# Patient Record
Sex: MALE | Race: WHITE | NOT HISPANIC OR LATINO
[De-identification: names, ages, dates, MRNs, and addresses within clinical notes are randomized per-mention and may not be internally consistent; named-entity substitution may affect disease eponyms.]

---

## 2020-09-01 ENCOUNTER — OFFICE VISIT (OUTPATIENT)
Age: 65
End: 2020-09-01

## 2020-10-01 ENCOUNTER — OFFICE VISIT (OUTPATIENT)
Age: 65
End: 2020-10-01

## 2020-10-19 ENCOUNTER — OFFICE VISIT (OUTPATIENT)
Dept: URBAN - METROPOLITAN AREA CLINIC 7 | Facility: CLINIC | Age: 65
End: 2020-10-19

## 2020-10-26 ENCOUNTER — TELEPHONE ENCOUNTER (OUTPATIENT)
Dept: URBAN - METROPOLITAN AREA CLINIC 9 | Facility: CLINIC | Age: 65
End: 2020-10-26

## 2020-10-27 ENCOUNTER — TELEPHONE ENCOUNTER (OUTPATIENT)
Dept: URBAN - METROPOLITAN AREA CLINIC 9 | Facility: CLINIC | Age: 65
End: 2020-10-27

## 2020-10-28 ENCOUNTER — OFFICE VISIT (OUTPATIENT)
Dept: URBAN - METROPOLITAN AREA SURGERY CENTER 5 | Facility: SURGERY CENTER | Age: 65
End: 2020-10-28

## 2020-11-10 ENCOUNTER — OFFICE VISIT (OUTPATIENT)
Dept: URBAN - METROPOLITAN AREA CLINIC 7 | Facility: CLINIC | Age: 65
End: 2020-11-10

## 2020-12-14 ENCOUNTER — TELEPHONE ENCOUNTER (OUTPATIENT)
Dept: URBAN - METROPOLITAN AREA CLINIC 9 | Facility: CLINIC | Age: 65
End: 2020-12-14

## 2020-12-16 ENCOUNTER — TELEPHONE ENCOUNTER (OUTPATIENT)
Dept: URBAN - METROPOLITAN AREA CLINIC 9 | Facility: CLINIC | Age: 65
End: 2020-12-16

## 2020-12-17 ENCOUNTER — OFFICE VISIT (OUTPATIENT)
Dept: URBAN - METROPOLITAN AREA SURGERY CENTER 5 | Facility: SURGERY CENTER | Age: 65
End: 2020-12-17

## 2021-01-04 ENCOUNTER — TELEPHONE ENCOUNTER (OUTPATIENT)
Dept: URBAN - METROPOLITAN AREA CLINIC 9 | Facility: CLINIC | Age: 66
End: 2021-01-04

## 2021-09-01 ENCOUNTER — TELEPHONE ENCOUNTER (OUTPATIENT)
Dept: URBAN - METROPOLITAN AREA CLINIC 9 | Facility: CLINIC | Age: 66
End: 2021-09-01

## 2022-07-09 ENCOUNTER — TELEPHONE ENCOUNTER (OUTPATIENT)
Dept: URBAN - METROPOLITAN AREA CLINIC 121 | Facility: CLINIC | Age: 67
End: 2022-07-09

## 2022-07-09 RX ORDER — EZETIMIBE 10 MG/1
TABLET ORAL THREE TIMES A DAY
Refills: 0 | OUTPATIENT
Start: 2018-08-30 | End: 2018-08-30

## 2022-07-10 ENCOUNTER — TELEPHONE ENCOUNTER (OUTPATIENT)
Dept: URBAN - METROPOLITAN AREA CLINIC 121 | Facility: CLINIC | Age: 67
End: 2022-07-10

## 2022-07-10 RX ORDER — IBUPROFEN 200 MG
TABLET ORAL ONCE A DAY
Refills: 0 | Status: ACTIVE | COMMUNITY
Start: 2018-08-30

## 2022-07-10 RX ORDER — LOSARTAN POTASSIUM 100 MG/1
TABLET, FILM COATED ORAL ONCE A DAY
Refills: 0 | Status: ACTIVE | COMMUNITY
Start: 2018-08-30

## 2022-07-10 RX ORDER — ALPRAZOLAM 0.5 MG/1
TABLET ORAL THREE TIMES A DAY
Refills: 0 | Status: ACTIVE | COMMUNITY
Start: 2018-08-30

## 2022-07-10 RX ORDER — EZETIMIBE 10 MG/1
TABLET ORAL ONCE A DAY
Refills: 0 | Status: ACTIVE | COMMUNITY
Start: 2018-08-30

## 2022-07-10 RX ORDER — NITROGLYCERIN 0.4 MG/1
TABLET SUBLINGUAL AS NEEDED
Refills: 0 | Status: ACTIVE | COMMUNITY
Start: 2018-08-30

## 2022-07-10 RX ORDER — FAMOTIDINE 10 MG
TABLET ORAL THREE TIMES A DAY
Refills: 0 | Status: ACTIVE | COMMUNITY
Start: 2018-08-30

## 2022-07-30 ENCOUNTER — TELEPHONE ENCOUNTER (OUTPATIENT)
Age: 67
End: 2022-07-30

## 2022-07-31 ENCOUNTER — TELEPHONE ENCOUNTER (OUTPATIENT)
Age: 67
End: 2022-07-31

## 2022-07-31 RX ORDER — OMEPRAZOLE 20 MG/1
1 (ONE) TABLET, DELAYED RELEASE ORAL
Qty: 0 | Refills: 9 | Status: ACTIVE | COMMUNITY
Start: 2021-09-01

## 2022-07-31 RX ORDER — OMEPRAZOLE 20 MG/1
1 (ONE) TABLET, DELAYED RELEASE ORAL
Qty: 0 | Refills: 9 | Status: ACTIVE | COMMUNITY
Start: 2020-12-14

## 2022-07-31 RX ORDER — OMEPRAZOLE 20 MG/1
1 (ONE) TABLET, DELAYED RELEASE ORAL
Qty: 0 | Refills: 3 | Status: ACTIVE | COMMUNITY
Start: 2020-10-19

## 2022-07-31 RX ORDER — OMEPRAZOLE 20 MG/1
1 (ONE) TABLET, DELAYED RELEASE ORAL
Qty: 0 | Refills: 9 | Status: ACTIVE | COMMUNITY
Start: 2021-08-31

## 2022-07-31 RX ORDER — CIMETIDINE 400 MG
1 (ONE) TABLET ORAL
Qty: 0 | Refills: 5 | Status: ACTIVE | COMMUNITY
Start: 2020-11-10

## 2025-06-19 ENCOUNTER — OFFICE VISIT (OUTPATIENT)
Dept: URBAN - METROPOLITAN AREA CLINIC 7 | Facility: CLINIC | Age: 70
End: 2025-06-19
Payer: MEDICARE

## 2025-06-19 ENCOUNTER — TELEPHONE ENCOUNTER (OUTPATIENT)
Dept: URBAN - METROPOLITAN AREA CLINIC 7 | Facility: CLINIC | Age: 70
End: 2025-06-19

## 2025-06-19 ENCOUNTER — DASHBOARD ENCOUNTERS (OUTPATIENT)
Age: 70
End: 2025-06-19

## 2025-06-19 DIAGNOSIS — I25.10 CORONARY ARTERY DISEASE INVOLVING NATIVE HEART, UNSPECIFIED VESSEL OR LESION TYPE, UNSPECIFIED WHETHER ANGINA PRESENT: ICD-10-CM

## 2025-06-19 DIAGNOSIS — G89.29 OTHER CHRONIC PAIN: ICD-10-CM

## 2025-06-19 DIAGNOSIS — K59.03 DRUG-INDUCED CONSTIPATION: ICD-10-CM

## 2025-06-19 DIAGNOSIS — M35.3 PMR (POLYMYALGIA RHEUMATICA): ICD-10-CM

## 2025-06-19 DIAGNOSIS — M54.9 DORSALGIA, UNSPECIFIED: ICD-10-CM

## 2025-06-19 DIAGNOSIS — Z12.11 COLON CANCER SCREENING: ICD-10-CM

## 2025-06-19 DIAGNOSIS — Z90.49 S/P APPENDECTOMY: ICD-10-CM

## 2025-06-19 DIAGNOSIS — R19.4 CHANGE IN BOWEL HABITS: ICD-10-CM

## 2025-06-19 DIAGNOSIS — Z85.118 HISTORY OF LUNG CANCER: ICD-10-CM

## 2025-06-19 PROBLEM — 428251008: Status: ACTIVE | Noted: 2025-06-19

## 2025-06-19 PROBLEM — 82423001: Status: ACTIVE | Noted: 2025-06-19

## 2025-06-19 PROBLEM — 65323003: Status: ACTIVE | Noted: 2025-06-19

## 2025-06-19 PROBLEM — 21782001: Status: ACTIVE | Noted: 2025-06-19

## 2025-06-19 PROBLEM — 53741008: Status: ACTIVE | Noted: 2025-06-19

## 2025-06-19 PROBLEM — 305058001: Status: ACTIVE | Noted: 2025-06-19

## 2025-06-19 PROBLEM — 235595009: Status: ACTIVE | Noted: 2025-06-19

## 2025-06-19 PROBLEM — 428878000: Status: ACTIVE | Noted: 2025-06-19

## 2025-06-19 PROCEDURE — 99204 OFFICE O/P NEW MOD 45 MIN: CPT

## 2025-06-19 RX ORDER — POLYETHYLENE GLYCOL 3350 17 G/DOSE
238GM POWDER (GRAM) ORAL ONCE A DAY
Qty: 238 GM | OUTPATIENT
Start: 2025-06-19 | End: 2025-06-20

## 2025-06-19 RX ORDER — PREDNISONE 1 MG/1
WEEK 1 TAKE 4.5 TABS PER DAY WITH YOUR 10 MG TABLET FOR 14.5 MG TOTAL. WEEK 2 TAKE TAKE 4 TABS PER DAY WITH YOUR 10 MG TABLET FOR 14 MG TOTA TABLET ORAL
Qty: 170 UNSPECIFIED | Refills: 0 | Status: ACTIVE | COMMUNITY

## 2025-06-19 RX ORDER — AMLODIPINE BESYLATE 5 MG/1
1 TABLET TABLET ORAL ONCE A DAY
Status: ACTIVE | COMMUNITY

## 2025-06-19 RX ORDER — LOSARTAN POTASSIUM 100 MG/1
TABLET, FILM COATED ORAL ONCE A DAY
Refills: 0 | Status: ACTIVE | COMMUNITY
Start: 2018-08-30

## 2025-06-19 RX ORDER — SUMATRIPTAN SUCCINATE 100 MG/1
1 TABLET AS NEEDED, MAY TAKE SECOND DOSE AT LEAST 2 HOURS AFTER FIRST DOSE UP TO 2 TABLETS PER DAY AS NEEDED TABLET, FILM COATED ORAL ONCE A DAY
Qty: 30 | Status: ACTIVE | COMMUNITY
Start: 2025-06-19

## 2025-06-19 RX ORDER — HYDROCODONE BITARTRATE AND ACETAMINOPHEN 5; 325 MG/1; MG/1
1 TABLET AS NEEDED TABLET ORAL
Status: ACTIVE | COMMUNITY
Start: 2025-06-19

## 2025-06-19 RX ORDER — FAMOTIDINE 10 MG
TABLET ORAL THREE TIMES A DAY
Refills: 0 | COMMUNITY
Start: 2018-08-30

## 2025-06-19 RX ORDER — LUBIPROSTONE 24 UG/1
1 CAPSULE WITH FOOD AND WATER CAPSULE, GELATIN COATED ORAL TWICE A DAY
Qty: 60 | Refills: 2 | OUTPATIENT
Start: 2025-06-20 | End: 2025-09-18

## 2025-06-19 RX ORDER — EZETIMIBE 10 MG/1
TABLET ORAL ONCE A DAY
Refills: 0 | Status: ACTIVE | COMMUNITY
Start: 2018-08-30

## 2025-06-19 RX ORDER — CIMETIDINE 400 MG/1
1 (ONE) TABLET, FILM COATED ORAL
Qty: 0 | Refills: 5 | COMMUNITY
Start: 2020-11-10

## 2025-06-19 RX ORDER — ALPRAZOLAM 0.5 MG/1
TABLET ORAL THREE TIMES A DAY
Refills: 0 | Status: ACTIVE | COMMUNITY
Start: 2018-08-30

## 2025-06-19 RX ORDER — IBUPROFEN 200 MG
TABLET ORAL ONCE A DAY
Refills: 0 | COMMUNITY
Start: 2018-08-30

## 2025-06-19 RX ORDER — DOCUSATE SODIUM 100 MG/1
1 CAPSULE AS NEEDED CAPSULE, LIQUID FILLED ORAL ONCE A DAY
Status: ACTIVE | COMMUNITY

## 2025-06-19 RX ORDER — METOCLOPRAMIDE 10 MG/1
TAKE ONE TABLET BY MOUTH THREE TIMES A DAY TABLET ORAL
Qty: 90 UNSPECIFIED | Refills: 1 | Status: ACTIVE | COMMUNITY

## 2025-06-19 RX ORDER — NITROGLYCERIN 0.4 MG/1
TABLET SUBLINGUAL AS NEEDED
Refills: 0 | COMMUNITY
Start: 2018-08-30

## 2025-06-19 RX ORDER — GABAPENTIN 100 MG/1
1-2 CAPSULE AT BEDTIME CAPSULE ORAL ONCE A DAY
Status: ACTIVE | COMMUNITY

## 2025-06-19 RX ORDER — OMEPRAZOLE 20 MG/1
1 (ONE) TABLET, DELAYED RELEASE ORAL
Qty: 0 | Refills: 9 | COMMUNITY
Start: 2021-09-01

## 2025-06-19 RX ORDER — TESTOSTERONE CYPIONATE 200 MG/ML
INJECTION INTRAMUSCULAR
Qty: 1 MILLILITER | Status: ACTIVE | COMMUNITY

## 2025-06-19 RX ORDER — HYDROCORTISONE 25 MG/ML
1 APPLICATION LOTION TOPICAL ONCE A DAY
Status: ACTIVE | COMMUNITY
Start: 2025-06-19

## 2025-06-19 NOTE — HPI-TODAY'S VISIT:
Patient is a 70-year-old female seen today regarding a change in bowel habits witth constipation. History of hypertension, hyperlipidemia, CAD, CVA, recurrent falls, BPH, PMR, and h/o lung cancer. More recently, he is s/p laparoscopic appendectomy with washout of peritoneal abscess on 2/22/2025 with Dr. Huber.   He reports having a long-standing history of intermittent constipation, with worsening symptoms since a laparoscopic appendectomy in February 2025. He currently experiences abdominal pain/discomfort that is typically relieved by bowel movements. Despite being on opioid pain medication, he is able to move his bowels daily with the use of suppositories (2-3 times per day), daily Miralax, and docusate. He also uses Gas-X as needed with benefit. He reports occasional nausea but no vomiting, and takes Reglan as needed. He has no overt rectal bleeding, fevers, chills, or unintentional weight loss. Mobility is limited due to chronic back pain, shoulder pain, and a known history of polymyalgia rheumatica (PMR). He follows with a nephrologist for chronic hyponatremia and remains on a fluid restriction. Last colonoscopy was over 10 years ago. He denies any personal history of colon polyps and has no family history of IBD or colorectal cancer. History of CAD with stent placement in 2017. Not on anticoagulation currently. Follows with a cardiologist annually. History of lung cancer, treated with radiation in September 2023. Has been in remission since then. Follows closely with his pulmonologist every 6 months. Not on oxygen.   Patient was recently hospitalized at [facility] on 2/20/2025.  He originally presented with abdominal pain and constipation symptoms.  CBC revealed leukocytosis with left shift, CMP with hyponatremia/hypochloremia, low serum osmolality, elevated lactic acid, normal lipase, normal TSH, normal PT.  UA within normal range.  Chest x-ray negative for acute cardiopulmonary process.  CT abdomen/pelvis overall findings concerning for perforated appendicitis with very active ileitis.  Patient started on antibiotics and IVF in ED (ceftriaxone and metronidazole), and GI surgery was consulted.  He subsequently underwent laparoscopic appendectomy with washout of peritoneal abscess on 2/22/2025 with Dr. Huber.  Blood cultures showed NGTD 4/5.  Histology samples pending.  Patient will need to follow-up with surgery in 1 to 2 weeks outpatient.  MICHAEL drain pulled and patient encouraged to ambulate.  H/H stable.  Secondary to anemia of chronic disease.  Nephrology was consulted for acute on chronic hyponatremia.  Patient received tolvaptan 15 mg daily with sodium chloride tablet and ureoPRO. Records reviewed.  Last seen in 2020 by Dr. Walton for reflux esophagitis. EGD/colon were ordered by never performed.

## 2025-07-10 ENCOUNTER — OFFICE VISIT (OUTPATIENT)
Dept: URBAN - METROPOLITAN AREA SURGERY CENTER 5 | Facility: SURGERY CENTER | Age: 70
End: 2025-07-10